# Patient Record
Sex: FEMALE | Race: ASIAN | NOT HISPANIC OR LATINO
[De-identification: names, ages, dates, MRNs, and addresses within clinical notes are randomized per-mention and may not be internally consistent; named-entity substitution may affect disease eponyms.]

---

## 2019-02-25 PROBLEM — Z92.21 HISTORY OF CHEMOTHERAPY: Status: RESOLVED | Noted: 2019-02-25 | Resolved: 2019-02-25

## 2019-02-25 PROBLEM — I10 HYPERTENSION: Status: RESOLVED | Noted: 2019-02-25 | Resolved: 2019-02-25

## 2019-02-25 PROBLEM — Z98.890 HISTORY OF BONE MARROW BIOPSY: Status: RESOLVED | Noted: 2019-02-25 | Resolved: 2019-02-25

## 2019-02-25 PROBLEM — R89.7 ABNORMAL BREAST BIOPSY: Status: RESOLVED | Noted: 2019-02-25 | Resolved: 2019-02-25

## 2019-02-25 PROBLEM — Z00.00 ENCOUNTER FOR PREVENTIVE HEALTH EXAMINATION: Status: ACTIVE | Noted: 2019-02-25

## 2019-02-26 ENCOUNTER — APPOINTMENT (OUTPATIENT)
Dept: RADIATION ONCOLOGY | Facility: CLINIC | Age: 69
End: 2019-02-26
Payer: MEDICAID

## 2019-02-26 VITALS
OXYGEN SATURATION: 98 % | HEART RATE: 75 BPM | RESPIRATION RATE: 16 BRPM | WEIGHT: 141.53 LBS | SYSTOLIC BLOOD PRESSURE: 127 MMHG | DIASTOLIC BLOOD PRESSURE: 77 MMHG

## 2019-02-26 DIAGNOSIS — Z87.828 PERSONAL HISTORY OF OTHER (HEALED) PHYSICAL INJURY AND TRAUMA: ICD-10-CM

## 2019-02-26 DIAGNOSIS — C85.90 NON-HODGKIN LYMPHOMA, UNSPECIFIED, UNSPECIFIED SITE: ICD-10-CM

## 2019-02-26 DIAGNOSIS — Z80.3 FAMILY HISTORY OF MALIGNANT NEOPLASM OF BREAST: ICD-10-CM

## 2019-02-26 DIAGNOSIS — Z98.890 OTHER SPECIFIED POSTPROCEDURAL STATES: ICD-10-CM

## 2019-02-26 DIAGNOSIS — R89.7 ABNORMAL HISTOLOGICAL FINDINGS IN SPECIMENS FROM OTHER ORGANS, SYSTEMS AND TISSUES: ICD-10-CM

## 2019-02-26 DIAGNOSIS — Z92.21 PERSONAL HISTORY OF ANTINEOPLASTIC CHEMOTHERAPY: ICD-10-CM

## 2019-02-26 DIAGNOSIS — I10 ESSENTIAL (PRIMARY) HYPERTENSION: ICD-10-CM

## 2019-02-26 DIAGNOSIS — C85.99 NON-HODGKIN LYMPHOMA, UNSPECIFIED, EXTRANODAL AND SOLID ORGAN SITES: ICD-10-CM

## 2019-02-26 PROCEDURE — 99204 OFFICE O/P NEW MOD 45 MIN: CPT | Mod: 25

## 2019-02-26 NOTE — VITALS
[Maximal Pain Intensity: 0/10] : 0/10 [Least Pain Intensity: 0/10] : 0/10 [80: Normal activity with effort; some signs or symptoms of disease.] : 80: Normal activity with effort; some signs or symptoms of disease.  [ECOG Performance Status: 2 - Ambulatory and capable of all self care but unable to carry out any work activities] : Performance Status: 2 - Ambulatory and capable of all self care but unable to carry out any work activities. Up and about more than 50% of waking hours [0 - No Distress] : Distress Level: 0

## 2019-03-07 NOTE — PHYSICAL EXAM
[Sclera] : the sclera and conjunctiva were normal [Respiration, Rhythm And Depth] : normal respiratory rhythm and effort [Auscultation Breath Sounds / Voice Sounds] : lungs were clear to auscultation bilaterally [Heart Rate And Rhythm] : heart rate and rhythm were normal [Heart Sounds] : normal S1 and S2 [Breast Palpation Mass] : no palpable masses [Breast Abnormal Lactation (Galactorrhea)] : no nipple discharge [No UE Edema] : there is no upper extremity edema [Bowel Sounds] : normal bowel sounds [Abdomen Soft] : soft [Cervical Lymph Nodes Enlarged Posterior Bilaterally] : posterior cervical [Cervical Lymph Nodes Enlarged Anterior Bilaterally] : anterior cervical [Supraclavicular Lymph Nodes Enlarged Bilaterally] : supraclavicular [Axillary Lymph Nodes Enlarged Bilaterally] : axillary [Musculoskeletal - Swelling] : no joint swelling [Range of Motion to Joints] : range of motion to joints [Skin Color & Pigmentation] : normal skin color and pigmentation [Oriented To Time, Place, And Person] : oriented to person, place, and time [PERRL With Normal Accommodation] : pupils were equal in size, round, reactive to light [Extraocular Movements] : extraocular movements were intact [Normal] : supple with no thyromegaly or masses appreciated [Murmurs] : no murmurs present [] : no hepato-splenomegaly [No Focal Deficits] : no focal deficits [de-identified] : Left chest wall port [de-identified] : left breast mastectomy healed incision; no palpable masses in the right breast [de-identified] : left chest and abdomen healed  surgical incisions

## 2019-03-07 NOTE — REASON FOR VISIT
[Other: ___] : [unfilled] [Other: _____] : [unfilled] [Consideration of Curative Therapy] : consideration of curative therapy for

## 2019-03-07 NOTE — DISEASE MANAGEMENT
[Clinical] : TNM Stage: c [N/A] : Currently not applicable [FreeTextEntry4] : localized diffuse large B cell lymphoma [TTNM] : 2 [NTNM] : 0 [MTNM] : 0
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
## 2019-03-07 NOTE — HISTORY OF PRESENT ILLNESS
[FreeTextEntry1] : [facility]  # [id number]\par Ms Kalyani Mott is a 68 year old post menopausal, never smoker female who presents today for consideration of radiation therapy for diffuse large B-cell lymphoma of the right breast. \par \par Her PMH is significant for biopsy-proven diffuse large B-cell lymphoma  of the left breast measuring 7 x 6 x 4 cm, diagnosed in [place] in 2013. She is s/p modified Left breast mastectomy and lymph node dissection and s/p 7/8 cycles of chemotherapy in [place] in 2013.  She [place] came [place].\par \par She reports that she was feeling well until 8/2018 when she palpated a mass in the right breast.\par She subsequently had a mammogran/ultrasound of the right breast on 8/2/18 which showed a 3.6 cm mass in the right lower central breast. A Right breast diagnostic ultrasound on the same date demonstrated  a 3.9 x 2.8 x 1.9 cm mass at 6:00-7:00 1 cm FN  corresponding to the mammographic mass. There was also a 1.9 x 1.3 x 1.5 cm periareolar complex solid/cystic mass at 9:00 and a 0.9 x 0.9 x 0.4 cm mass demonstrating vascularity within the solid component.  There was a 0.9 x 0.9 x 0.4 cm hypoechoic nodule representing similar pathology to the 9:00 periareolar lesion.\par \par On  8/7/2018, she underwent Right breast biopsies of 2 areas: nodule at 6:00-7:00 1 cm FN and a 1.9 cm periareolar nodule at 9:00. Pathology showed  diffuse large B-cell lymphoma for both lesions.  \par \par A bone marrow biopsy was performed in 8/13/2018 by Dr. Prabhakar Jeff which showed normocellular bone marrow with mild erythroid hyperplasia and increased iron storage; a lymphoid aggregate with mixed B and T cells; Flow cytometry revealed no immunophenotypic evidence of non-hodgkin's lymphoma, acute leukemia, increase in blasts or plasma cell neoplasm.\par \par Under the care of Dr. Jeff, patient started R-CHOP chemotherapy in September 2018.\par A right breast ultrasound on 10/25/18  showed an interval decrease in the size and number.\par She completed 8 cycles sometime in January 2019.\par \par CT chest/abd/pelvis performed on 2/8/2019 showed no evidence of lymphadenopathy of splenomegaly in the chest, abdomen or pelvis. Previously seen right breast mass no longer visualized.(comparison 8/15/2018)\par \par A CT neck on 2/8/19 showed  no evidence of cervical lymphadenopathy. \par \par She was referred by Dr. Jeff for consideration of radiation therapy.\par \par Today she states that she is recovering from an upper respiratory infection and has a residual cough. Denies fever, chills or night sweats, lymphadenopathy. Notably, she states that she will be going to [place] on 3/16/19 and returning on 4/6/19.\par

## 2019-03-25 ENCOUNTER — OUTPATIENT (OUTPATIENT)
Dept: OUTPATIENT SERVICES | Facility: HOSPITAL | Age: 69
LOS: 1 days | Discharge: HOME | End: 2019-03-25
Payer: MEDICAID

## 2019-03-25 PROCEDURE — 77262 THER RADIOLOGY TX PLNG INTRM: CPT

## 2019-03-26 ENCOUNTER — APPOINTMENT (OUTPATIENT)
Dept: RADIATION ONCOLOGY | Facility: CLINIC | Age: 69
End: 2019-03-26

## 2019-04-10 PROCEDURE — 77333 RADIATION TREATMENT AID(S): CPT | Mod: 26

## 2019-04-10 PROCEDURE — 77290 THER RAD SIMULAJ FIELD CPLX: CPT | Mod: 26

## 2019-04-19 PROCEDURE — 77295 3-D RADIOTHERAPY PLAN: CPT | Mod: 26

## 2019-04-19 PROCEDURE — 77334 RADIATION TREATMENT AID(S): CPT | Mod: 26

## 2019-04-19 PROCEDURE — 77300 RADIATION THERAPY DOSE PLAN: CPT | Mod: 26

## 2019-04-25 ENCOUNTER — OTHER (OUTPATIENT)
Age: 69
End: 2019-04-25

## 2019-04-26 PROCEDURE — 77280 THER RAD SIMULAJ FIELD SMPL: CPT | Mod: 26

## 2019-04-29 PROCEDURE — G6017: CPT

## 2019-04-29 NOTE — REVIEW OF SYSTEMS
[Fatigue: Grade 1 - Fatigue relieved by rest] : Fatigue: Grade 1 - Fatigue relieved by rest [Cough: Grade 0] : Cough: Grade 0 [Dyspnea: Grade 0] : Dyspnea: Grade 0 [Pneumonitis: Grade 0] : Pneumonitis: Grade 0 [Alopecia: Grade 0] : Alopecia: Grade 0 [Pruritus: Grade 0] : Pruritus: Grade 0 [Skin Atrophy: Grade 0] : Skin Atrophy: Grade 0 [Skin Hyperpigmentation: Grade 0] : Skin Hyperpigmentation: Grade 0 [Skin Induration: Grade 0] : Skin Induration: Grade 0 [Dermatitis Radiation: Grade 0] : Dermatitis Radiation: Grade 0

## 2019-04-30 PROCEDURE — G6017: CPT

## 2019-05-01 PROCEDURE — G6017: CPT

## 2019-05-02 PROCEDURE — G6017: CPT

## 2019-05-03 PROCEDURE — 77427 RADIATION TX MANAGEMENT X5: CPT

## 2019-05-03 PROCEDURE — G6017: CPT

## 2019-05-06 PROCEDURE — G6017: CPT

## 2019-05-06 NOTE — REVIEW OF SYSTEMS
[Cough: Grade 0] : Cough: Grade 0 [Fatigue: Grade 1 - Fatigue relieved by rest] : Fatigue: Grade 1 - Fatigue relieved by rest [Pneumonitis: Grade 0] : Pneumonitis: Grade 0 [Dyspnea: Grade 0] : Dyspnea: Grade 0 [Skin Hyperpigmentation: Grade 0] : Skin Hyperpigmentation: Grade 0 [Dermatitis Radiation: Grade 0] : Dermatitis Radiation: Grade 0 [Skin Induration: Grade 0] : Skin Induration: Grade 0

## 2019-05-07 PROCEDURE — G6017: CPT

## 2019-05-08 PROCEDURE — G6017: CPT

## 2019-05-09 PROCEDURE — G6017: CPT

## 2019-05-10 PROCEDURE — G6017: CPT

## 2019-05-10 PROCEDURE — 77427 RADIATION TX MANAGEMENT X5: CPT

## 2019-05-12 NOTE — HISTORY OF PRESENT ILLNESS
[FreeTextEntry1] : Patient is a 68 year old female with a diagnosis of localized right breast diffuse large B-cell lymphoma, s/p 8 cycles of chemotherapy with complete radiographic response.\par \par 4/29/19- 1/20 fx. No changes from preRT baseline.

## 2019-05-12 NOTE — DISEASE MANAGEMENT
[Clinical] : TNM Stage: c [N/A] : Currently not applicable [FreeTextEntry4] : localized diffuse large B cell lymphoma [TTNM] : 2 [NTNM] : 0 [MTNM] : 0 [de-identified] : [id number] [de-identified] : right breast

## 2019-05-12 NOTE — PHYSICAL EXAM
[Normal] : well developed, well nourished, in no acute distress [Skin Color & Pigmentation] : normal skin color and pigmentation [Oriented To Time, Place, And Person] : oriented to person, place, and time [Breast Palpation Mass] : no palpable masses [Axillary Lymph Nodes Enlarged Bilaterally] : axillary

## 2019-05-12 NOTE — DISEASE MANAGEMENT
[Clinical] : TNM Stage: c [N/A] : Currently not applicable [FreeTextEntry4] : localized diffuse large B cell lymphoma [MTNM] : 0 [NTNM] : 0 [TTNM] : 2 [de-identified] : right breast [de-identified] : [id number]

## 2019-05-12 NOTE — HISTORY OF PRESENT ILLNESS
[FreeTextEntry1] : Patient is a 68 year old female with a diagnosis of localized right breast diffuse large B-cell lymphoma, s/p 8 cycles of chemotherapy with complete radiographic response.\par \par 5/6/19- 6/20 fx\par Today she notes that she feels well. No complaints. Not using aquaphor. \par \par 4/29/19- 1/20 fx. No changes from preRT baseline

## 2019-05-12 NOTE — PHYSICAL EXAM
[Skin Color & Pigmentation] : normal skin color and pigmentation [Normal] : well developed, well nourished, in no acute distress

## 2019-05-13 PROCEDURE — G6017: CPT

## 2019-05-13 NOTE — VITALS
[Least Pain Intensity: 0/10] : 0/10 [Maximal Pain Intensity: 0/10] : 0/10 [80: Normal activity with effort; some signs or symptoms of disease.] : 80: Normal activity with effort; some signs or symptoms of disease.  [ECOG Performance Status: 1 - Restricted in physically strenuous activity but ambulatory and able to carry out work of a light or sedentary nature] : Performance Status: 1 - Restricted in physically strenuous activity but ambulatory and able to carry out work of a light or sedentary nature, e.g., light house work, office work

## 2019-05-14 PROCEDURE — G6017: CPT

## 2019-05-14 NOTE — REVIEW OF SYSTEMS
[Fatigue: Grade 1 - Fatigue relieved by rest] : Fatigue: Grade 1 - Fatigue relieved by rest [Cough: Grade 0] : Cough: Grade 0 [Dyspnea: Grade 0] : Dyspnea: Grade 0 [Skin Hyperpigmentation: Grade 0] : Skin Hyperpigmentation: Grade 0 [Skin Induration: Grade 0] : Skin Induration: Grade 0 [Dermatitis Radiation: Grade 1 - Faint erythema or dry desquamation] : Dermatitis Radiation: Grade 1 - Faint erythema or dry desquamation

## 2019-05-14 NOTE — PHYSICAL EXAM
[Normal] : well developed, well nourished, in no acute distress [Axillary Lymph Nodes Enlarged Bilaterally] : axillary [Breast Palpation Mass] : no palpable masses [Oriented To Time, Place, And Person] : oriented to person, place, and time [de-identified] : faint erythema treated right breast

## 2019-05-14 NOTE — HISTORY OF PRESENT ILLNESS
[FreeTextEntry1] : Patient is a 68 year old female with a diagnosis of localized right breast diffuse large B-cell lymphoma, s/p 8 cycles of chemotherapy with complete radiographic response.\par \par [facility]  Tk # [id number]\par 5/13/19- 11/20 fx\par No new breast complaints. Notes that she is recovering from a cold. Not using aquaphor\par \par 5/6/19- 6/20 fx\par Today she notes that she feels well. No complaints. Not using aquaphor. \par \par 4/29/19- 1/20 fx. No changes from preRT baseline

## 2019-05-14 NOTE — DISEASE MANAGEMENT
[Clinical] : TNM Stage: c [N/A] : Currently not applicable [FreeTextEntry4] : localized diffuse large B cell lymphoma [TTNM] : 2 [NTNM] : 0 [MTNM] : 0 [de-identified] : [id number] [de-identified] : right breast

## 2019-05-15 PROCEDURE — G6017: CPT

## 2019-05-16 PROCEDURE — G6017: CPT

## 2019-05-17 PROCEDURE — G6017: CPT

## 2019-05-17 PROCEDURE — 77427 RADIATION TX MANAGEMENT X5: CPT

## 2019-05-20 PROCEDURE — G6017: CPT

## 2019-05-21 PROCEDURE — G6017: CPT

## 2019-05-22 PROCEDURE — G6017: CPT

## 2019-05-23 PROCEDURE — G6017: CPT

## 2019-05-24 PROCEDURE — 77427 RADIATION TX MANAGEMENT X5: CPT

## 2019-05-24 PROCEDURE — G6017: CPT

## 2019-06-02 NOTE — DISEASE MANAGEMENT
[FreeTextEntry4] : localized diffuse large B cell lymphoma [TTNM] : 2 [NTNM] : 0 [de-identified] : [id number] [MTNM] : 0 [de-identified] : [id number] [de-identified] : right breast

## 2019-06-02 NOTE — HISTORY OF PRESENT ILLNESS
[FreeTextEntry1] : Patient is a 68 year old female with a diagnosis of localized right breast diffuse large B-cell lymphoma, s/p 8 cycles of chemotherapy with complete radiographic response.\par \par 5/20/19 [facility]  # [id number]\par Patient has no new complaints, denies pain, fatigue. Recovering from a cold, denies chills, fever, cough, headaches. Reports forgets to use aquaphor, reinforced benefits of use, verbalized understanding\par \par \par [facility]  Tk # [id number]\par 5/13/19- 11/20 fx\par No new breast complaints. Notes that she is recovering from a cold. Not using aquaphor\par \par 5/6/19- 6/20 fx\par Today she notes that she feels well. No complaints. Not using aquaphor. \par \par 4/29/19- 1/20 fx. No changes from preRT baseline

## 2019-06-26 ENCOUNTER — APPOINTMENT (OUTPATIENT)
Dept: RADIATION ONCOLOGY | Facility: CLINIC | Age: 69
End: 2019-06-26
Payer: MEDICAID

## 2019-06-26 VITALS
DIASTOLIC BLOOD PRESSURE: 71 MMHG | RESPIRATION RATE: 16 BRPM | WEIGHT: 141.53 LBS | HEART RATE: 87 BPM | SYSTOLIC BLOOD PRESSURE: 116 MMHG | OXYGEN SATURATION: 98 %

## 2019-06-26 PROCEDURE — 99024 POSTOP FOLLOW-UP VISIT: CPT

## 2019-06-26 RX ORDER — AMLODIPINE BESYLATE 5 MG/1
5 TABLET ORAL DAILY
Refills: 0 | Status: ACTIVE | COMMUNITY
Start: 2019-06-26

## 2019-06-26 RX ORDER — BENAZEPRIL HYDROCHLORIDE 40 MG/1
40 TABLET, FILM COATED ORAL DAILY
Refills: 0 | Status: ACTIVE | COMMUNITY
Start: 2019-06-26

## 2019-06-26 NOTE — REVIEW OF SYSTEMS
[Negative] : Constitutional [Fatigue: Grade 0] : Fatigue: Grade 0 [Breast Pain: Grade 0] : Breast Pain: Grade 0 [Cough: Grade 0] : Cough: Grade 0 [Skin Hyperpigmentation: Grade 0] : Skin Hyperpigmentation: Grade 0 [Dyspnea: Grade 0] : Dyspnea: Grade 0 [Skin Induration: Grade 0] : Skin Induration: Grade 0 [Dermatitis Radiation: Grade 0] : Dermatitis Radiation: Grade 0

## 2019-07-07 NOTE — PHYSICAL EXAM
[General Appearance - Alert] : alert [General Appearance - In No Acute Distress] : in no acute distress [Normal] : no respiratory distress, lungs were clear to auscultation bilaterally [Heart Sounds] : normal S1 and S2 [Heart Rate And Rhythm] : heart rate and rhythm were normal [Breast Abnormal Lactation (Galactorrhea)] : no nipple discharge [No UE Edema] : there is no upper extremity edema [Cervical Lymph Nodes Enlarged Posterior Bilaterally] : posterior cervical [Cervical Lymph Nodes Enlarged Anterior Bilaterally] : anterior cervical [Supraclavicular Lymph Nodes Enlarged Bilaterally] : supraclavicular [Axillary Lymph Nodes Enlarged Bilaterally] : axillary [Oriented To Time, Place, And Person] : oriented to person, place, and time [Breast Palpation Mass] : no palpable masses [de-identified] : right breast [de-identified] : mild rash treated breast, right vascular port site chest incision healed

## 2019-07-07 NOTE — HISTORY OF PRESENT ILLNESS
[FreeTextEntry1] : Ms. Mott is a 68-year-old female with  diffuse large B-cell lymphoma of the lower-outer quadrant of right female breast, s/p chemotherapy with complete radiographic response. She was treated to a dose of 3,600 cGy to the right breast from 4/29/2019 - 5/24/2019.  She tolerated her radiation well without significant acute side effects and was instructed to follow up with Dr(s). Gerard Dietz and Prabhakar Jeff. \par \par [facility]  # [id number]\par Today she states that she feels well. Denies breast pain / mass, or skin irritation of treated breast.  She saw Dr. Shabazz recently and had her port removed.\par

## 2019-07-07 NOTE — ASSESSMENT
[No evidence of disease] : No evidence of disease TBD, pt was just recently at rehab. Pt is a high fall risk

## 2019-10-18 ENCOUNTER — APPOINTMENT (OUTPATIENT)
Dept: RADIATION ONCOLOGY | Facility: CLINIC | Age: 69
End: 2019-10-18
Payer: MEDICAID

## 2019-10-18 VITALS
OXYGEN SATURATION: 97 % | TEMPERATURE: 97.7 F | RESPIRATION RATE: 18 BRPM | SYSTOLIC BLOOD PRESSURE: 112 MMHG | DIASTOLIC BLOOD PRESSURE: 71 MMHG | HEART RATE: 90 BPM | WEIGHT: 144.18 LBS

## 2019-10-18 DIAGNOSIS — C83.30 DIFFUSE LARGE B-CELL LYMPHOMA, UNSPECIFIED SITE: ICD-10-CM

## 2019-10-18 PROCEDURE — 99213 OFFICE O/P EST LOW 20 MIN: CPT

## 2019-11-18 NOTE — REVIEW OF SYSTEMS
[Diarrhea: Grade 0] : Diarrhea: Grade 0 [Constipation: Grade 0] : Constipation: Grade 0 [Dysphagia: Grade 0] : Dysphagia: Grade 0 [Fatigue: Grade 0] : Fatigue: Grade 0 [Breast Pain: Grade 0] : Breast Pain: Grade 0 [Cough: Grade 0] : Cough: Grade 0 [Dyspnea: Grade 0] : Dyspnea: Grade 0 [Dermatitis Radiation: Grade 0] : Dermatitis Radiation: Grade 0 [Skin Hyperpigmentation: Grade 0] : Skin Hyperpigmentation: Grade 0 [Skin Induration: Grade 0] : Skin Induration: Grade 0

## 2019-11-18 NOTE — HISTORY OF PRESENT ILLNESS
[FreeTextEntry1] : [facility]  grand-daughter per patient's request- Unruly\par \par Ms. Mott is a 68 year old woman with a diagnosis of a diffuse large B-cell lymphoma of the lower-outer quadrant of the right breast who underwent chemotherapy with complete radiographic response.  She was then treated adjuvantly to 36 Gy to the right breast from 4/29/19 to 5/24/19.  She tolerated her radiation well without significant acute side effects.  She follows with Dr. Gerard Dietz and Dr. Prabhakar Jeff.\par \par She followed up for post-treatment evaluation on 6/26/19 and was doing well.  Plan was to continue aquaphor and hydrocortisone 1% and follow up with her other physicians.\par \par She presents for follow-up.\par Today she reports that she feels well. Denies pain, fever, chills or night sweats or itching. She saw Dr. Jeff recently and he will schedule breast  imaging for January. \par

## 2019-11-18 NOTE — PHYSICAL EXAM
[Heart Sounds] : normal S1 and S2 [Heart Rate And Rhythm] : heart rate and rhythm were normal [Breast Abnormal Lactation (Galactorrhea)] : no nipple discharge [No UE Edema] : there is no upper extremity edema [Axillary Lymph Nodes Enlarged Bilaterally] : axillary [Supraclavicular Lymph Nodes Enlarged Bilaterally] : supraclavicular [Cervical Lymph Nodes Enlarged Anterior Bilaterally] : anterior cervical [Cervical Lymph Nodes Enlarged Posterior Bilaterally] : posterior cervical [Oriented To Time, Place, And Person] : oriented to person, place, and time [] : no rash [Normal] : supple with no thyromegaly or masses appreciated [Breast Palpation Mass] : no palpable masses [de-identified] : s/p left mastectomy